# Patient Record
Sex: FEMALE | Race: WHITE | NOT HISPANIC OR LATINO | Employment: OTHER | URBAN - METROPOLITAN AREA
[De-identification: names, ages, dates, MRNs, and addresses within clinical notes are randomized per-mention and may not be internally consistent; named-entity substitution may affect disease eponyms.]

---

## 2017-07-25 ENCOUNTER — TRANSCRIBE ORDERS (OUTPATIENT)
Dept: ADMINISTRATIVE | Facility: HOSPITAL | Age: 67
End: 2017-07-25

## 2017-07-25 DIAGNOSIS — Z12.31 ENCOUNTER FOR SCREENING MAMMOGRAM FOR MALIGNANT NEOPLASM OF BREAST: Primary | ICD-10-CM

## 2017-08-09 ENCOUNTER — GENERIC CONVERSION - ENCOUNTER (OUTPATIENT)
Dept: OTHER | Facility: OTHER | Age: 67
End: 2017-08-09

## 2017-08-14 ENCOUNTER — GENERIC CONVERSION - ENCOUNTER (OUTPATIENT)
Dept: OTHER | Facility: OTHER | Age: 67
End: 2017-08-14

## 2017-08-14 DIAGNOSIS — R92.8 OTHER ABNORMAL AND INCONCLUSIVE FINDINGS ON DIAGNOSTIC IMAGING OF BREAST: ICD-10-CM

## 2017-08-14 DIAGNOSIS — Z12.31 ENCOUNTER FOR SCREENING MAMMOGRAM FOR MALIGNANT NEOPLASM OF BREAST: ICD-10-CM

## 2017-08-16 ENCOUNTER — ALLSCRIPTS OFFICE VISIT (OUTPATIENT)
Dept: OTHER | Facility: OTHER | Age: 67
End: 2017-08-16

## 2017-08-18 ENCOUNTER — GENERIC CONVERSION - ENCOUNTER (OUTPATIENT)
Dept: OTHER | Facility: OTHER | Age: 67
End: 2017-08-18

## 2017-08-24 ENCOUNTER — HOSPITAL ENCOUNTER (OUTPATIENT)
Dept: MAMMOGRAPHY | Facility: CLINIC | Age: 67
Discharge: HOME | End: 2017-08-24
Payer: COMMERCIAL

## 2017-08-24 DIAGNOSIS — Z12.31 ENCOUNTER FOR SCREENING MAMMOGRAM FOR MALIGNANT NEOPLASM OF BREAST: ICD-10-CM

## 2017-08-24 PROCEDURE — G0202 SCR MAMMO BI INCL CAD: HCPCS

## 2017-08-24 PROCEDURE — 77063 BREAST TOMOSYNTHESIS BI: CPT

## 2017-08-28 ENCOUNTER — GENERIC CONVERSION - ENCOUNTER (OUTPATIENT)
Dept: OTHER | Facility: OTHER | Age: 67
End: 2017-08-28

## 2017-09-05 ENCOUNTER — GENERIC CONVERSION - ENCOUNTER (OUTPATIENT)
Dept: OTHER | Facility: OTHER | Age: 67
End: 2017-09-05

## 2017-10-25 NOTE — PROGRESS NOTES
Plan  Encounter for screening mammogram for malignant neoplasm of breast    · MAMMO SCREENING BILATERAL W 3D & CAD; Status:Active - Retrospective By Protocol  Authorization; Requested for:[id number]; Perform:[facility]; WZQ:[id number]; Last Updated Batoolodalis Smith; 9/5/2017 2:55:49 PM;Ordered;For:Encounter for screening mammogram for malignant neoplasm of breast; Ordered By:Scottie Grant; Infected cyst of skin    · Cephalexin 250 MG Oral Capsule   Rx By: Teri Arana; Dispense: 10 Days ; #:30 Capsule; Refill: 0;For: Infected cyst of skin; CECIL = N; Verified Transmission to [id number] Last Updated By: Aileen Capps; 9/5/2017 2:28:57 PM  Osteopenia    · Follow-up visit in 1 year Evaluation and Treatment  Follow-up  Status: Complete  Done:  41WNM2421   Ordered; For: Osteopenia; Ordered By: Teri Arana Performed:  Due: 56LJO1294; Last Updated By: Beryl Bose; 9/5/2017 2:55:49 PM    Discussion/Summary    See H and P for details  PE of breasts, thyroid, LNs, abdomen and Pelvis (surgically devoid of cervix, uterus and adnexae) all normal   Pap completed  To continue with maximum dietary calcium, 1000 U vitamin D or more, and weight bearing exercise  BMD repeat in 2019  Colonoscopy per protocol  RT 1 year or prn  Chief Complaint  74year old parous and  woman, for yearly gyn visit  History of Present Illness  HPI: 74year old woman, parous and ,  Had ectopic pregnancy x 1, abdnomrl bleeding, and endometriosis , and utliamately GLO, BSO  Now with no complaints  Had osteopenia on BMD in 8/17 with -1 8 and some Foxamax  , now off  Mammography 2013 with density on left, followed then and at 6 months and normal since to 8/17    Pap completed and normal in 2012 with negative HPVHR  Colonoscopy normal in 2014  Now for yearly visit  GYN HM, Adult Female [facility]: The patient is being seen for a gynecology evaluation     General Health:   Reproductive health: the patient is postmenopausal    Screening: Additional History:  See HPI  Review of Systems  Additional findings include See HPI  Constitutional: as noted in HPI  Gastrointestinal: as noted in HPI  Genitourinary: as noted in HPI  ROS reviewed  Active Problems  1  Abnormal mammogram (793 80) (R92 8)   2  Abnormal pelvic exam (793 5) (Z01 411)   3  Breast lump (611 72) (N63)   4  Encounter for gynecologic examination for high-risk patient covered by Medicare   (V72 31,V15 89) (Z91 89)   5  Encounter for routine gynecological examination (V72 31) (Z01 419)   6  Encounter for screening mammogram for malignant neoplasm of breast (V76 12)   (Z12 31)   7  Infected cyst of skin (706 2) (L72 9,L08 9)   8  Migraine (346 90) (G43 909)   9  Osteopenia (733 90) (M85 80)   10  Postmenopausal disorder (627 9) (N95 9)    Past Medical History   · History of Dysmenorrhea (625 3) (N94 6)   · History of Endometriosis (617 9) (N80 9)   · History of anemia (V12 3) (Z86 2)   · History of headache (V13 89) (Z87 898)   · History of hypokalemia (V12 29) (Z86 39)   · History of osteopenia (V13 59) (Z87 39)   · History of Menometrorrhagia (626 2) (N92 1)   · History of Salpingitis (614 2)    Surgical History   · History of Oophorectomy   · History of Salpingectomy   · History of Surgical Excision Of Tubal Pregnancy   · History of Total Abdominal Hysterectomy    Family History  Mother    · Family history of Acquired Polycythemia  Family History    · Family history of Acinar Cell Carcinoma Of The Rectum   · Family history of Adenocarcinoma In Situ In Villous Adenoma Of The Pancreas    Social History   · Daily Coffee Consumption (___ Cups/Day)   · Exercises strenuously less than 3 times a week   ·    · Never a smoker   · Social alcohol use (Z78 9)    Current Meds   1  Botox SOLR Recorded   2  Cephalexin 250 MG Oral Capsule; TAKE 1 CAPSULE 3 TIMES DAILY;    Therapy: 10War8112 to (Evaluate:72Bnv6391)  Requested for: 90Imk6251; Last Rx: 68MFL8140 Ordered   3  Metoprolol Succinate ER 25 MG Oral Tablet Extended Release 24 Hour Recorded   4  Multi Vitamin/Minerals Oral Tablet; Therapy: 54MRX1276 to Recorded   5  Quinapril HCl - 40 MG Oral Tablet; Therapy: 58HVP3460 to (Last Rx:07Mar2011)  Requested for: 68HER5925 Ordered   6  Vitamin D 1000 UNIT CAPS; Therapy: 20VLG6298 to Recorded    Allergies  1  No Known Drug Allergies    Vitals   Recorded: 72MDV6352 87:58VD   Systolic 399, LUE, Sitting   Diastolic 80, LUE, Sitting   Height 5 ft 3 in   Weight 181 lb    BMI Calculated 32 06   BSA Calculated 1 85   LMP      Physical Exam    Constitutional   General appearance: No acute distress, well appearing and well nourished  Neck   Neck: Normal, supple, trachea midline, no masses  Thyroid: Normal, no thyromegaly  Genitourinary   External genitalia: Normal and no lesions appreciated  Vagina: Normal, no lesions or dryness appreciated  Urethra: Normal     Urethral meatus: Normal     Bladder: Normal, soft, non-tender and no prolapse or masses appreciated  Cervix: Surgically absent  Uterus: Surgically absent  Adnexa/parametria: Surgically absent  Anus, perineum, and rectum: Normal sphincter tone, no masses, and no prolapse  Chest   Breasts: Normal and no dimpling or skin changes noted  Abdomen   Abdomen: Normal, non-tender, and no organomegaly noted  Liver and spleen: No hepatomegaly or splenomegaly  Examination for hernias: No hernias appreciated  Lymphatic   Palpation of lymph nodes in neck, axillae, groin and/or other locations: No lymphadenopathy or masses noted  Skin   Skin and subcutaneous tissue: Normal skin turgor and no rashes      Palpation of skin and subcutaneous tissue: Normal     Psychiatric   Orientation to person, place, and time: Normal     Mood and affect: Normal        Signatures   Electronically signed by : Mark Cardenas MD; Sep  5 2017  2:58PM EST                       (Author)

## 2018-01-12 NOTE — MISCELLANEOUS
Message   Recorded as Task   Date: 08/14/2017 09:35 AM, Created By: Angela Saab   Task Name: Care Coordination   Assigned To: STEPH GYN,Team   Regarding Patient: Fabio Garibay, Status: In Progress   Comment:    Nadja Zurita - 14 Aug 2017 9:35 AM     TASK CREATED  Caller: Self; Care Coordination; [phone] (Home)  pt needs orders in for a diagostic tanna and left limited ultrasound and right limited ultrasound  she goes to [facility]  had to r/s yly to 10/3  [phone] if any questions  Edwina Nielsen - 14 Aug 2017 10:45 AM     TASK IN PROGRESS   Edwina Nielsen - 14 Aug 2017 10:55 AM     TASK EDITED  Scripts in system - will mail them to pt - also r/s her yearly to 09/05  Edwina Nielsen - 14 Aug 2017 11:12 AM     TASK EDITED  Faxed scripts to [facility]  Active Problems    1  Abnormal mammogram (793 80) (R92 8)   2  Abnormal pelvic exam (793 5) (Z01 411)   3  Breast lump (611 72) (N63)   4  Encounter for gynecologic examination for high-risk patient covered by Medicare   (V72 31,V15 89) (Z91 89)   5  Encounter for routine gynecological examination (V72 31) (Z01 419)   6  Encounter for screening mammogram for malignant neoplasm of breast (V76 12)   (Z12 31)   7  Infected cyst of skin (706 2) (L72 9,L08 9)   8  Migraine (346 90) (G43 909)   9  Osteopenia (733 90) (M85 80)   10  Postmenopausal disorder (627 9) (N95 9)    Current Meds   1  Botox SOLR Recorded   2  Cephalexin 250 MG Oral Capsule; TAKE 1 CAPSULE 3 TIMES DAILY; Therapy: 15Xof6369 to (Evaluate:02Vcx2275)  Requested for: 56Kbf3569; Last   Rx:82Eka5635 Ordered   3  Metoprolol Succinate ER 25 MG Oral Tablet Extended Release 24 Hour Recorded   4  Multi Vitamin/Minerals Oral Tablet; Therapy: 53GIW8646 to Recorded   5  Quinapril HCl - 40 MG Oral Tablet; Therapy: 58MGI6756 to (Last Rx:07Mar2011)  Requested for: 53UQW5236 Ordered   6  Vitamin D 1000 UNIT CAPS; Therapy: 85IUH2160 to Recorded    Allergies    1   No Known Drug Allergies    Signatures   Electronically signed by : Darylene Cure, ; Aug 14 2017 11:12AM EST                       (Author)

## 2018-01-12 NOTE — MISCELLANEOUS
Message   Recorded as Task   Date: 08/23/2016 11:53 AM, Created By: Rico Wolfe   Task Name: Medical Complaint Callback   Assigned To: Jose Grant   Regarding Patient: Bob Chavis, Status: Active   CommentLynfelton Lopez - 23 Aug 2016 11:53 AM     TASK CREATED  pt called stating her hard lump has popped and it is now draining, pt states it happened this morning still having a some drainage, advised to keep an eye on it and if needed she can clean it with a gauze, advised to observe, signs of infection discussed with patient advised to call us if s/s of infection happen  no other concerns at this time  If any other recommendations please advise  thank you!! Active Problems    1  Abnormal pelvic exam (793 5) (Z01 411)   2  Breast lump (611 72) (N63)   3  Encounter for gynecologic examination for high-risk patient covered by Medicare   (V72 31,V15 89) (Z91 89)   4  Encounter for routine gynecological examination (V72 31) (Z01 419)   5  Encounter for screening mammogram for malignant neoplasm of breast (V76 12)   (Z12 31)   6  Migraine (346 90) (G43 909)   7  Osteopenia (733 90) (M85 80)   8  Postmenopausal disorder (627 9) (N95 9)    Current Meds   1  Botox SOLR Recorded   2  Metoprolol Succinate ER 25 MG Oral Tablet Extended Release 24 Hour Recorded   3  Multi Vitamin/Minerals Oral Tablet; Therapy: 09TQQ5482 to Recorded   4  Quinapril HCl - 40 MG Oral Tablet; Therapy: 09EJQ0407 to (Last Rx:07Mar2011)  Requested for: 36ZIP3535 Ordered   5  Vitamin D 1000 UNIT CAPS; Therapy: 81IRC3316 to Recorded    Allergies    1  No Known Drug Allergies    Signatures   Electronically signed by : Avel Favre, LPN;  Aug 23 2016 11:53AM EST                       (Author)

## 2018-01-12 NOTE — MISCELLANEOUS
Message   Recorded as Task   Date: 08/25/2017 02:55 PM, Created By: Rianna Schwartz   Task Name: Follow Up   Assigned To: Alejo Clay   Regarding Patient: Waleska Navarro, Status: In Progress   Comment:    Jose Grant - 25 Aug 2017 2:55 PM     TASK CREATED  While slightly worse at -2 2 pt does not have osteoporosis, she may continue with vigorous dietary calcium and at least 1000 U Vit D per day, along with weight bearing exercise  Recommendation to repeat BMD in 2 years  Edwina Nielsen - 25 Aug 2017 2:57 PM     TASK IN PROGRESS   Edwina Nielsen - 25 Aug 2017 2:59 PM     TASK EDITED  [facility] to cb for DEXA results  ext: Selena Villafuerte - 28 Aug 2017 3:06 PM     TASK EDITED  Patient is aware of DEXA results and M87 recommendations  Active Problems    1  Abnormal mammogram (793 80) (R92 8)   2  Abnormal pelvic exam (793 5) (Z01 411)   3  Breast lump (611 72) (N63)   4  Encounter for gynecologic examination for high-risk patient covered by Medicare   (V72 31,V15 89) (Z91 89)   5  Encounter for routine gynecological examination (V72 31) (Z01 419)   6  Encounter for screening mammogram for malignant neoplasm of breast (V76 12)   (Z12 31)   7  Infected cyst of skin (706 2) (L72 9,L08 9)   8  Migraine (346 90) (G43 909)   9  Osteopenia (733 90) (M85 80)   10  Postmenopausal disorder (627 9) (N95 9)    Current Meds   1  Botox SOLR Recorded   2  Cephalexin 250 MG Oral Capsule; TAKE 1 CAPSULE 3 TIMES DAILY; Therapy: 69Bvf4161 to (Evaluate:63Fnt3970)  Requested for: 89Dxw5518; Last   Rx:05Hki5483 Ordered   3  Metoprolol Succinate ER 25 MG Oral Tablet Extended Release 24 Hour Recorded   4  Multi Vitamin/Minerals Oral Tablet; Therapy: 32CXN0066 to Recorded   5  Quinapril HCl - 40 MG Oral Tablet; Therapy: 04LYR6845 to (Last Rx:07Mar2011)  Requested for: 99ELR5618 Ordered   6  Vitamin D 1000 UNIT CAPS; Therapy: 74SDB6380 to Recorded    Allergies    1   No Known Drug Allergies    Signatures Electronically signed by : Viviana Mantilla, ; Aug 28 2017  3:06PM EST                       (Author)

## 2018-01-13 NOTE — RESULT NOTES
Message  Draiined vulva EIC and doing better but would abx as she is going to [facility] for 28 days  Keflex 250 tid x 10 Days Rxed  RT prn        Plan  Infected cyst of skin    · Cephalexin 250 MG Oral Capsule; TAKE 1 CAPSULE 3 TIMES DAILY    Signatures   Electronically signed by : Steve Washington MD; Aug 24 2016  2:01PM EST                       (Author)

## 2018-01-14 NOTE — MISCELLANEOUS
Message  Follow up Dx Mammography and US read by Dr Romero Roopa  Cystic area in right breast of no concern, but a few irregular nodes in left axilla with normal left breast  May be reactive, but talking to him and to pt twice, we have decided to proceed with US guided LN Bx  She understands all, has been reassured, and will proceed, after being called by his Nurse Navigator  Plan  Encounter for screening mammogram for malignant neoplasm of breast    · *US BREAST LEFT LIMITED (DIAGNOSTIC); Status:Resulted - Requires Verification;    Done: 92MWY8297 01:01PM   · *US BREAST RIGHT LIMITED (DIAGNOSTIC); Status:Resulted - Requires Verification;    Done: 59JNW1213 01:01PM   · MAMMO DIAGNOSTIC BILATERAL W CAD; Status:Resulted - Requires Verification;    Done: 16BWU5351 01:01PM  Osteopenia    · * DXA BONE DENSITY SPINE HIP AND PELVIS; Status:Active;  Requested  ZN45OPQ6729;     Signatures   Electronically signed by : Brian Yeboah MD; Aug  5 2016  9:46AM EST                       (Author)

## 2018-01-15 NOTE — MISCELLANEOUS
Message   Recorded as Task   Date: 08/14/2017 09:35 AM, Created By: Errol Luke   Task Name: Care Coordination   Assigned To: STEPH GYN,Team   Regarding Patient: Cande Vivar, Status: In Progress   Comment:    Nadja Zurita - 14 Aug 2017 9:35 AM     TASK CREATED  Caller: Self; Care Coordination; [phone] (Home)  pt needs orders in for a diagostic tanna and left limited ultrasound and right limited ultrasound  she goes to [facility]  had to r/s yly to 10/3  [phone] if any questions  GiaEdwina holman - 14 Aug 2017 10:45 AM     TASK IN PROGRESS   KittitasEdwina moore - 14 Aug 2017 10:55 AM     TASK EDITED  Scripts in system - will mail them to pt - also r/s her yearly to 09/05  GiaEdwina moore - 14 Aug 2017 11:12 AM     TASK EDITED  [facility] Aug 2017 11:12 AM     TASK Angie Borjas - 18 Aug 2017 8:36 AM     TASK REACTIVATED   Soniya Massey - 18 Aug 2017 8:39 AM     TASK EDITED  Ирина Hoskins from [facility] called - said pt is coming in on mondy (8/21) for her Mammo Screening - she was confused at to what exactly was being done - plus the orders in the chart have been voided - no current orders  Please advise  [address] [place] [address] 18 Aug 2017 8:58 AM     TASK IN PROGRESS   GiaEdwina holman - 18 Aug 2017 9:06 AM     TASK EDITED  Shakir lópez from [facility] - verified pt was scheduled for Mammo Bilateral Screening  Order in the system  Active Problems    1  Abnormal mammogram (793 80) (R92 8)   2  Abnormal pelvic exam (793 5) (Z01 411)   3  Breast lump (611 72) (N63)   4  Encounter for gynecologic examination for high-risk patient covered by Medicare   (V72 31,V15 89) (Z91 89)   5  Encounter for routine gynecological examination (V72 31) (Z01 419)   6  Encounter for screening mammogram for malignant neoplasm of breast (V76 12)   (Z12 31)   7  Infected cyst of skin (706 2) (L72 9,L08 9)   8  Migraine (346 90) (G43 909)   9   Osteopenia (787 14) (M85 80)   10  Postmenopausal disorder (627 9) (N95 9)    Current Meds   1  Botox SOLR Recorded   2  Cephalexin 250 MG Oral Capsule; TAKE 1 CAPSULE 3 TIMES DAILY; Therapy: 98Afn4552 to (Evaluate:03Sep2016)  Requested for: 98Byt7293; Last   Rx:09Bsk3941 Ordered   3  Metoprolol Succinate ER 25 MG Oral Tablet Extended Release 24 Hour Recorded   4  Multi Vitamin/Minerals Oral Tablet; Therapy: 22HCZ6101 to Recorded   5  Quinapril HCl - 40 MG Oral Tablet; Therapy: 73WCE0274 to (Last Rx:07Mar2011)  Requested for: 44CAO4013 Ordered   6  Vitamin D 1000 UNIT CAPS; Therapy: 86ERS1190 to Recorded    Allergies    1  No Known Drug Allergies    Plan  Encounter for screening mammogram for malignant neoplasm of breast    · * MAMMO SCREENING BILATERAL W CAD; Status:Hold For - Scheduling,Retrospective  By Protocol Authorization;  Requested for:07Jxm4563;     Signatures   Electronically signed by : Hiwot Adler, ; Aug 18 2017  9:06AM EST                       (Author)

## 2018-01-15 NOTE — MISCELLANEOUS
Message   Recorded as Task   Date: 08/23/2016 03:02 PM, Created By: Lara Roblero   Task Name: Medical Complaint Callback   Assigned To: Jessica Baxter   Regarding Patient: Jesus Lackey, Status: In Progress   Comment:    Jose Grant - 23 Aug 2016 3:02 PM     TASK CREATED  Please thank pt for the information about the lump that "popped' on her vulva and about which she and I talked a few days ago  It sounded like an oil gland cyst that was infected and certainly does now  She was soaking it at my suggestion  Drainage should take care of it, but it may stay  a little firm for awhile  She lives in [place] so a visit is a chore for her, but we can check if she wishes  thanks   Gifty Knowles - 87 Aug 2016 3:06 PM     TASK IN PROGRESS   Karey Jones - 23 Aug 2016 3:40 PM     TASK EDITED                 lm making patient aware of recommendations, if she would like to be seen to call our office back for Apt  Skin doing better  A little sore as expected  (after pustule opened)  Would like Abx as she is gong to [place] for 4 weeks  Told her we typically don't have to Rx these, I'll Rx Keflex 250 tid x 10 days  RT prn  1        1 Amended By: Lara Roblero; Aug 24 2016 1:57 PM EST    Active Problems   1  Abnormal pelvic exam (793 5) (Z01 411)  2  Breast lump (611 72) (N63)  3  Encounter for gynecologic examination for high-risk patient covered by Medicare   (V72 31,V15 89) (Z91 89)  4  Encounter for routine gynecological examination (V72 31) (Z01 419)  5  Encounter for screening mammogram for malignant neoplasm of breast (V76 12)   (Z12 31)  6  Migraine (346 90) (G43 909)  7  Osteopenia (733 90) (M85 80)  8  Postmenopausal disorder (627 9) (N95 9)    Current Meds  1  Botox SOLR Recorded  2  Metoprolol Succinate ER 25 MG Oral Tablet Extended Release 24 Hour Recorded  3  Multi Vitamin/Minerals Oral Tablet; Therapy: 57UIE6772 to Recorded  4  Quinapril HCl - 40 MG Oral Tablet;    Therapy: 53VFD8048 to (Last Rx:07Mar2011)  Requested for: 90NFK8822 Ordered  5  Vitamin D 1000 UNIT CAPS; Therapy: 27UVE2283 to Recorded    Allergies   1  No Known Drug Allergies    Signatures   Electronically signed by : Leatha Batista LPN;  Aug 24 2016  3:33PM EST                       (Author)

## 2018-01-17 NOTE — MISCELLANEOUS
Message   Recorded as Task   Date: 08/04/2016 07:59 AM, Created By: Steph Prado   Task Name: Result Follow Up   Assigned To: Magui Beckett   Regarding Patient: Modesto Stanley, Status: In Progress   Comment:    Jose Grant - 04 Aug 2016 7:59 AM     TASK CREATED  Pt has had multiple instances in past with need for "additional imaging" on breasts, and worked out fine  Again, she requires same on each breast from yesterday's Mammographies, for "asymmetric densities" in each, but no obvious disease  Unfortunately she comes from [place], and I appreciate her loyalty  Without alarm, please be certain that the Navigator Nurse does get to her about follow up  Perhaps we should consider 3D on next  thanks, Rosita Nelson - 07 Aug 2016 9:00 AM     TASK IN PROGRESS   Berton Jeans - 04 Aug 2016 9:15 AM     TASK EDITED                 made pt aware, rx sent to EHR and to patient via mail  she will call scheduling for apt for follow up views  Active Problems    1  Abnormal pelvic exam (793 5) (Z01 411)   2  Encounter for gynecologic examination for high-risk patient covered by Medicare   (V72 31,V15 89) (Z01 419,Z91 89)   3  Encounter for routine gynecological examination (V72 31) (Z01 419)   4  Encounter for screening mammogram for malignant neoplasm of breast (V76 12)   (Z12 31)   5  Migraine (346 90) (G43 909)   6  Osteopenia (733 90) (M85 80)   7  Postmenopausal disorder (627 9) (N95 9)    Current Meds   1  Botox SOLR Recorded   2  Metoprolol Succinate ER 25 MG Oral Tablet Extended Release 24 Hour Recorded   3  Multi Vitamin/Minerals Oral Tablet; Therapy: 39HHO5671 to Recorded   4  Quinapril HCl - 40 MG Oral Tablet; Therapy: 96OUN9563 to (Last Rx:07Mar2011)  Requested for: 77DAU1103 Ordered   5  Vitamin D 1000 UNIT CAPS; Therapy: 68BSN5848 to Recorded    Allergies    1   No Known Drug Allergies    Plan  Encounter for screening mammogram for malignant neoplasm of breast    · *US BREAST LEFT LIMITED (DIAGNOSTIC); Status:Hold For - Scheduling,Retrospective  By Protocol Authorization; Requested for:19Zxx8136;    · *US BREAST RIGHT LIMITED (DIAGNOSTIC); Status:Hold For -  Scheduling,Retrospective By Protocol Authorization; Requested for:[id number];    · MAMMO DIAGNOSTIC BILATERAL W CAD; Status:Hold For - Scheduling,Retrospective  By Protocol Authorization; Requested EEJ:[id number];     Signatures   Electronically signed by : Frances Marcos LPN;  Aug  4 2016  9:15AM EST                       (Author)

## 2018-01-18 NOTE — RESULT NOTES
Message   Recorded as Task   Date: 08/05/2016 10:58 AM, Created By: Demetrius Otero   Task Name: Follow Up   Assigned To: Adeline Ruiz   Regarding Patient: Charley Gayle, Status: In Progress   DerekSuryarosa Malik - 05 Aug 2016 10:58 AM     TASK CREATED  Patient is scheduled for the (L) lymph node biopsy on 8/15/16  We are going to take a second look at the nodes prior to botox  While speaking with the patient I discovered she had botox injection several hours prior to her mammogram which could be causing the reactive lymph nodes  Juanita,Jose - 06 Aug 2016 1:08 PM     TASK EDITED           That would be good and maybe they'll settle down    Jose Grant - 06 Aug 2016 1:08 PM     TASK EDITED                 that would be good  maybe they'll settle down and the Bx can be precluded  Vishnu Berrios - 08 Aug 2016 8:10 AM     TASK REPLIED TO: Previously Assigned To Loida Caicedo  Do you mean you will reexamine her before the bx? Sorry, but I am confused  Demetrius Otero - 08 Aug 2016 8:49 AM     TASK REPLIED TO: Previously Assigned To Loida Caicedo  Yes, we will re-ultrasound the nodes prior to biopsy  As long as the biopsy script gives approval for additional imaging, we will be good  Vishnu Berrios - 08 Aug 2016 8:54 AM     TASK IN PROGRESS   Vishnu Berrios - 08 Aug 2016 8:56 AM     TASK EDITED  i put in another diag mammo and us slip - if needed and bx slip for l breast        Plan  Breast lump    · *US BREAST LEFT LIMITED (DIAGNOSTIC); Status:Hold For - [facility]; Requested for:61Ggt2728;    · MAMMO DIAGNOSTIC LEFT W CAD; Status:Hold For - Scheduling,Retrospective  Authorization; Requested for:[id number];    · [facility]; Status:Hold For -  Scheduling,Retrospective Authorization; Requested for:[id number];     Signatures   Electronically signed by :  Casey Peters, ; Aug  8 2016  8:57AM EST                       (Author)

## 2018-01-22 VITALS
BODY MASS INDEX: 32.07 KG/M2 | SYSTOLIC BLOOD PRESSURE: 142 MMHG | WEIGHT: 181 LBS | DIASTOLIC BLOOD PRESSURE: 80 MMHG | HEIGHT: 63 IN

## 2018-03-07 NOTE — PROGRESS NOTES
Discussion/Summary    Biopsy of axillary nodes negative for malignancy or lymphoproliferative disease  Probably reactive  Pt informed and reassured  Also discussed what sounds like sebaceous cyst on labia minor, with suggestions  Will mare prn if concerned  [facility]        Signatures   Electronically signed by : Julian Salazar MD; Aug 19 2016  9:59AM EST                       (Author)

## 2018-08-24 DIAGNOSIS — Z12.31 ENCOUNTER FOR SCREENING MAMMOGRAM FOR MALIGNANT NEOPLASM OF BREAST: ICD-10-CM

## 2021-04-13 DIAGNOSIS — Z23 ENCOUNTER FOR IMMUNIZATION: ICD-10-CM
